# Patient Record
Sex: MALE | Race: WHITE | URBAN - METROPOLITAN AREA
[De-identification: names, ages, dates, MRNs, and addresses within clinical notes are randomized per-mention and may not be internally consistent; named-entity substitution may affect disease eponyms.]

---

## 2019-06-04 ENCOUNTER — HISTORICAL (OUTPATIENT)
Dept: URGENT CARE | Facility: CLINIC | Age: 69
End: 2019-06-04

## 2019-06-04 LAB
BILIRUB SERPL-MCNC: NEGATIVE MG/DL
BLOOD URINE, POC: NEGATIVE
CLARITY, POC UA: NORMAL
COLOR, POC UA: NORMAL
GLUCOSE UR QL STRIP: NEGATIVE
KETONES UR QL STRIP: NEGATIVE
LEUKOCYTE EST, POC UA: NORMAL
NITRITE, POC UA: NEGATIVE
PH, POC UA: 6
PROTEIN, POC: NORMAL
SPECIFIC GRAVITY, POC UA: 1.02
UROBILINOGEN, POC UA: NORMAL

## 2020-11-30 ENCOUNTER — HISTORICAL (OUTPATIENT)
Dept: CARDIOLOGY | Facility: HOSPITAL | Age: 70
End: 2020-11-30

## 2021-01-20 ENCOUNTER — HISTORICAL (OUTPATIENT)
Dept: ADMINISTRATIVE | Facility: HOSPITAL | Age: 71
End: 2021-01-20

## 2021-01-20 LAB
ABS NEUT (OLG): 3.6 X10(3)/MCL (ref 2.1–9.2)
ALBUMIN SERPL-MCNC: 4.3 GM/DL (ref 3.4–5)
ALBUMIN/GLOB SERPL: 1.79 {RATIO} (ref 1.5–2.5)
ALP SERPL-CCNC: 73 UNIT/L (ref 38–126)
ALT SERPL-CCNC: 14 UNIT/L (ref 7–52)
APPEARANCE, UA: CLEAR
AST SERPL-CCNC: 24 UNIT/L (ref 15–37)
BACTERIA #/AREA URNS AUTO: ABNORMAL /HPF
BILIRUB SERPL-MCNC: 1.4 MG/DL (ref 0.2–1)
BILIRUB UR QL STRIP: NEGATIVE MG/DL
BILIRUBIN DIRECT+TOT PNL SERPL-MCNC: 0.3 MG/DL (ref 0–0.5)
BILIRUBIN DIRECT+TOT PNL SERPL-MCNC: 1.1 MG/DL
BUN SERPL-MCNC: 23 MG/DL (ref 7–18)
CALCIUM SERPL-MCNC: 9.5 MG/DL (ref 8.5–10.1)
CHLORIDE SERPL-SCNC: 105 MMOL/L (ref 98–107)
CO2 SERPL-SCNC: 25 MMOL/L (ref 21–32)
COLOR UR: YELLOW
CREAT SERPL-MCNC: 1 MG/DL (ref 0.6–1.3)
ERYTHROCYTE [DISTWIDTH] IN BLOOD BY AUTOMATED COUNT: 13.1 % (ref 11.5–17)
GLOBULIN SER-MCNC: 2.4 GM/DL (ref 1.2–3)
GLUCOSE (UA): NEGATIVE MG/DL
GLUCOSE SERPL-MCNC: 94 MG/DL (ref 74–106)
HCT VFR BLD AUTO: 43.5 % (ref 42–52)
HGB BLD-MCNC: 14.1 GM/DL (ref 14–18)
HGB UR QL STRIP: NEGATIVE UNIT/L
KETONES UR QL STRIP: NEGATIVE MG/DL
LEUKOCYTE ESTERASE UR QL STRIP: NEGATIVE UNIT/L
LYMPHOCYTES # BLD AUTO: 2.5 X10(3)/MCL (ref 0.6–3.4)
LYMPHOCYTES NFR BLD AUTO: 37.6 % (ref 13–40)
MCH RBC QN AUTO: 29.5 PG (ref 27–31.2)
MCHC RBC AUTO-ENTMCNC: 32 GM/DL (ref 32–36)
MCV RBC AUTO: 91 FL (ref 80–94)
MONOCYTES # BLD AUTO: 0.6 X10(3)/MCL (ref 0.1–1.3)
MONOCYTES NFR BLD AUTO: 9.4 % (ref 0.1–24)
NEUTROPHILS NFR BLD AUTO: 53 % (ref 47–80)
NITRITE UR QL STRIP.AUTO: NEGATIVE
PH UR STRIP: 5.5 [PH]
PLATELET # BLD AUTO: 181 X10(3)/MCL (ref 130–400)
PMV BLD AUTO: 9.4 FL (ref 9.4–12.4)
POTASSIUM SERPL-SCNC: 3.7 MMOL/L (ref 3.5–5.1)
PROT SERPL-MCNC: 6.7 GM/DL (ref 6.4–8.2)
PROT UR QL STRIP: NEGATIVE MG/DL
RBC # BLD AUTO: 4.78 X10(6)/MCL (ref 4.7–6.1)
RBC #/AREA URNS HPF: ABNORMAL /HPF
SODIUM SERPL-SCNC: 142 MMOL/L (ref 136–145)
SP GR UR STRIP: >1.03
SQUAMOUS EPITHELIAL, UA: ABNORMAL /LPF
UROBILINOGEN UR STRIP-ACNC: 0.2 MG/DL
WBC # SPEC AUTO: 6.7 X10(3)/MCL (ref 4.5–11.5)
WBC #/AREA URNS AUTO: ABNORMAL /[HPF]

## 2021-01-21 LAB — EST CREAT CLEARANCE SER (OHS): 78.01 ML/MIN

## 2022-04-09 ENCOUNTER — HISTORICAL (OUTPATIENT)
Dept: ADMINISTRATIVE | Facility: HOSPITAL | Age: 72
End: 2022-04-09

## 2022-04-27 VITALS
BODY MASS INDEX: 24.14 KG/M2 | WEIGHT: 182.13 LBS | SYSTOLIC BLOOD PRESSURE: 150 MMHG | OXYGEN SATURATION: 98 % | DIASTOLIC BLOOD PRESSURE: 90 MMHG | HEIGHT: 73 IN

## 2022-05-02 NOTE — HISTORICAL [facility] CERNER
This is a historical note converted from Francis. Formatting and pictures may have been removed.  Please reference Francis for original formatting and attached multimedia. Chief Complaint  OP INFECTION ON CALF OF LEFT LEG, STARTED IN NOVEMBER. COMPLETED ANTIBIOTICS, SWELLING IMPROVED BUT CAME BACK  History of Present Illness  Mid November pt was working in the yard left posterior calf.? Pain and swelling developed.? Pt saw 11/29/20.? Tx with 7 day course of Bactrim.? Symptoms improved but recurred.? Pt tx with suprax that he had left over from prescription from his urologist which helped.? Wound eventually healed.? Pt has had persistent intermittent?left le edema.? Pt did have venous US left LE which was negative for DVT.? Pt does admit to mild right lower extremity edema?as well.  Review of Systems  Constitutional:?No weight loss, no fever, no fatigue, no chills, no night sweats,?no weakness  Eyes:?No blurred vision,?no redness,?no drainage,?no ocular?pain  HEENT:?No sore throat,?no ear pain, no sinus pressure, no nasal congestion, no rhinorrhea, no postnasal drip  Respiratory:?No cough, no wheezing, no sputum production, no shortness of breath  Cardiovascular:?No chest pain, no palpitations, no dyspnea on exertion,?no orthopnea  Gastrointestinal:?No nausea, no vomiting, no abdominal pain, no diarrhea,?no constipation, no melena,?no hematochezia  Genitourinary:?No dysuria, no hematuria, no frequency, no urgency, no incontinence, no discharge  Musculoskeletal:?No myalgias, no arthralgias, no weakness, no joint effusion, edema  Integumentary:?No rashes, no hives, no itching, no lesions, no jaundice  Neurologic:?No headaches, no numbness, no tingling, no weakness, no dizziness  ?  Physical Exam  Vitals & Measurements  HR:?106(Peripheral)? BP:?150/90? SpO2:?98%?  HT:?185.00?cm? WT:?82.600?kg? BMI:?24.13?  General:?Well developed, Well-nourished, in No acute distress, A&O x 4  Cardiovascular:?Regular rate and rhythm, No  murmurs, No gallops, No rubs  Respiratory:?Clear to auscultation bilaterally, No wheezes, No rhonchi, No?crackles  Abdomen:? Soft, Nontender, No hepatosplenomegaly, Normal and equal bowel sounds, No masses, No rebound, No guarding  Musculoskeletal:? No tenderness, FROM, No joint abnormality, No clubbing, No cyanosis, 2+ left?ankle?edema  Integumentary:?No rashes or skin lesions  Assessment/Plan  1.?Leg edema?R60.0  ?Discuss?compression and elevation  Discussed diuretic therapy if needed  Ordered:  CBC w/ Auto Diff, Routine collect, 01/20/21 16:02:00 CST, Blood, Order for future visit, Stop date 01/20/21 16:02:00 CST, Lab Collect, Leg edema, 01/20/21 16:02:00 CST  Comprehensive Metabolic Panel, Routine collect, 01/20/21 16:02:00 CST, Blood, Order for future visit, Stop date 01/20/21 16:02:00 CST, Lab Collect, Leg edema, 01/20/21 16:02:00 CST  Lab Collection Request, 01/20/21 16:02:00 CST, [facility], 01/20/21 16:02:00 CST, Leg edema  Office/Outpatient Visit Level 4 Established [id number] PC, Leg edema, [facility], 01/20/21 16:04:00 CST  Urinalysis no Reflex, Routine collect, Urine, Order for future visit, 01/20/21 16:02:00 CST, Stop date 01/20/21 16:02:00 CST, Nurse collect, Leg edema  ?   Problem List/Past Medical History  Ongoing  Enlarged prostate  Wears glasses  Historical  BPH  can lie flat  h/o nephritis @ age 5  Hep A 1980s d/t oysters  no cardiologist  runs 6 miles every other day  urinary retention  Procedure/Surgical History  Other transurethral prostatectomy (08/14/2014)  Transurethral electrosurgical resection of prostate, including control of postoperative bleeding, complete (vasectomy, meatotomy, cystourethroscopy, urethral calibration and/or dilation, and internal urethrotomy are included). (08/14/2014)  Transurethral Resection Prostate (None) (08/14/2014)  ruptured appy (1999)  toe sx (1983)  Tonsillectomy (1955)   Medications  Acidophilus Probiotic Blend, Oral, Daily  Chondroitin-Glucosamine, 1  cap(s), Oral, Daily  finasteride 5 mg oral tablet, 5 mg= 1 tab(s), Oral, Daily  Flomax 0.4 mg oral capsule, 0.8 mg= 2 cap(s), Oral, Daily  Multiple Vitamins oral tab, 1 tab(s), Oral, Daily  Allergies  mycins?(oral sore)  Social History  Abuse/Neglect  No, 01/20/2021  Alcohol - Denies Alcohol Use, 08/04/2014  Current, Beer, 1-2 times per year, 06/04/2019  Employment/School  Retired, Highest education level: Post graduate degree(s)., 08/04/2014  Substance Use - Denies Substance Abuse, 08/04/2014  Never, 06/04/2019  Tobacco - Denies Tobacco Use, 08/04/2014  Never (less than 100 in lifetime), N/A, 01/20/2021  Never (less than 100 in lifetime), N/A, Smokeless Tobacco Use: Never., 06/04/2019  Family History  Lung cancer: Mother.  Metastatic cancer: Mother.  Primary malignant neoplasm of breast: Mother.  Primary malignant neoplasm of prostate: Brother.  Immunizations  Vaccine Date Status Comments   zoster vaccine live 06/25/2017 Recorded    tetanus-diphtheria toxoids 01/18/2016 Given No adverse reaction post injection  Consent for Td obtained; information sheet given   Health Maintenance  Health Maintenance  ???Pending?(in the next year)  ??? ??OverDue  ??? ? ? ?Diabetes Screening due??07/31/17??and every 3??year(s)  ??? ? ? ?Influenza Vaccine due??10/01/20??and every 1??day(s)  ??? ? ? ?Advance Directive due??01/02/21??and every 1??year(s)  ??? ? ? ?Cognitive Screening due??01/02/21??and every 1??year(s)  ??? ? ? ?Fall Risk Assessment due??01/02/21??and every 1??year(s)  ??? ? ? ?Functional Assessment due??01/02/21??and every 1??year(s)  ??? ??Due?  ??? ? ? ?Alcohol Misuse Screening due??01/02/21??and every 1??year(s)  ??? ? ? ?ADL Screening due??01/20/21??and every 1??year(s)  ??? ? ? ?Aspirin Therapy for CVD Prevention due??01/20/21??and every 1??year(s)  ??? ? ? ?Colorectal Screening due??01/20/21??Unknown Frequency  ??? ? ? ?Depression Screening due??01/20/21??Unknown Frequency  ??? ? ? ?Lipid Screening  due??01/20/21??Unknown Frequency  ??? ? ? ?Medicare Annual Wellness Exam due??01/20/21??and every 1??year(s)  ??? ? ? ?Pneumococcal Vaccine due??01/20/21??Unknown Frequency  ??? ? ? ?Zoster Vaccine due??01/20/21??Unknown Frequency  ??? ??Due In Future?  ??? ? ? ?Obesity Screening not due until??01/01/22??and every 1??year(s)  ???Satisfied?(in the past 1 year)  ??? ??Satisfied?  ??? ? ? ?Advance Directive on??11/29/20.??Satisfied by Deandre Jasso  ??? ? ? ?Blood Pressure Screening on??01/20/21.??Satisfied by Kaci Grimes LPN  ??? ? ? ?Body Mass Index Check on??01/20/21.??Satisfied by Kaci Grimes LPN  ??? ? ? ?Fall Risk Assessment on??11/29/20.??Satisfied by Deandre Jasso  ??? ? ? ?Influenza Vaccine on??01/20/21.??Satisfied by Kaci Grimes LPN  ??? ? ? ?Obesity Screening on??01/20/21.??Satisfied by Kaci Grimes LPN  ?

## 2022-09-16 ENCOUNTER — HISTORICAL (OUTPATIENT)
Dept: ADMINISTRATIVE | Facility: HOSPITAL | Age: 72
End: 2022-09-16